# Patient Record
Sex: MALE | Race: OTHER | HISPANIC OR LATINO | Employment: FULL TIME | URBAN - METROPOLITAN AREA
[De-identification: names, ages, dates, MRNs, and addresses within clinical notes are randomized per-mention and may not be internally consistent; named-entity substitution may affect disease eponyms.]

---

## 2022-12-05 ENCOUNTER — HOSPITAL ENCOUNTER (OUTPATIENT)
Facility: MEDICAL CENTER | Age: 22
End: 2022-12-05
Attending: PHYSICIAN ASSISTANT

## 2022-12-05 ENCOUNTER — OFFICE VISIT (OUTPATIENT)
Dept: URGENT CARE | Facility: CLINIC | Age: 22
End: 2022-12-05
Payer: COMMERCIAL

## 2022-12-05 VITALS
DIASTOLIC BLOOD PRESSURE: 68 MMHG | RESPIRATION RATE: 16 BRPM | WEIGHT: 159 LBS | HEIGHT: 68 IN | HEART RATE: 89 BPM | TEMPERATURE: 97.4 F | SYSTOLIC BLOOD PRESSURE: 118 MMHG | BODY MASS INDEX: 24.1 KG/M2 | OXYGEN SATURATION: 97 %

## 2022-12-05 DIAGNOSIS — R30.0 DYSURIA: ICD-10-CM

## 2022-12-05 DIAGNOSIS — N45.1 EPIDIDYMITIS: ICD-10-CM

## 2022-12-05 LAB
APPEARANCE UR: CLEAR
BILIRUB UR STRIP-MCNC: NEGATIVE MG/DL
COLOR UR AUTO: YELLOW
GLUCOSE UR STRIP.AUTO-MCNC: NEGATIVE MG/DL
KETONES UR STRIP.AUTO-MCNC: NEGATIVE MG/DL
LEUKOCYTE ESTERASE UR QL STRIP.AUTO: NEGATIVE
NITRITE UR QL STRIP.AUTO: NEGATIVE
PH UR STRIP.AUTO: 7 [PH] (ref 5–8)
PROT UR QL STRIP: NEGATIVE MG/DL
RBC UR QL AUTO: NEGATIVE
SP GR UR STRIP.AUTO: 1.02
UROBILINOGEN UR STRIP-MCNC: NORMAL MG/DL

## 2022-12-05 PROCEDURE — 87591 N.GONORRHOEAE DNA AMP PROB: CPT

## 2022-12-05 PROCEDURE — 81002 URINALYSIS NONAUTO W/O SCOPE: CPT | Performed by: PHYSICIAN ASSISTANT

## 2022-12-05 PROCEDURE — 99203 OFFICE O/P NEW LOW 30 MIN: CPT | Performed by: PHYSICIAN ASSISTANT

## 2022-12-05 PROCEDURE — 87491 CHLMYD TRACH DNA AMP PROBE: CPT

## 2022-12-05 RX ORDER — IBUPROFEN 200 MG
200 TABLET ORAL EVERY 6 HOURS PRN
COMMUNITY

## 2022-12-05 RX ORDER — LEVOFLOXACIN 750 MG/1
750 TABLET, FILM COATED ORAL
COMMUNITY
Start: 2022-11-19 | End: 2023-07-22

## 2022-12-05 RX ORDER — AZITHROMYCIN 500 MG/1
TABLET, FILM COATED ORAL
COMMUNITY
Start: 2022-10-21 | End: 2023-07-22

## 2022-12-05 RX ORDER — DOXYCYCLINE 100 MG/1
100 CAPSULE ORAL 2 TIMES DAILY
Qty: 20 CAPSULE | Refills: 0 | Status: SHIPPED | OUTPATIENT
Start: 2022-12-05 | End: 2022-12-15

## 2022-12-05 ASSESSMENT — ENCOUNTER SYMPTOMS: AFFECTED TESTICLE: 1

## 2022-12-05 NOTE — PROGRESS NOTES
"Subjective:   Delbert Radford is a 22 y.o. male who presents for Testicle Pain ((R) 1wk )         Testicular pain, right greater than left x 1 week. Atraumatic.  Did test positive for chlamydia a couple of months ago.  Was treated with azithro and levaquin which finished 5 days ago.  Pain started during that treatment. Had dysuria originally.  Feels slight pain after urination now.  No urethral discharge.  No lesions rashes sores.  The STI testing was done online and the urine was sent out so we don't have access to any of this.  States had urine culture negative.  Testicular pain feels constant, better standing.  Has labs available that show pos chlamyia, neg gonorrhea, neg hcv, neg syphilis, neg hiv.  Has had sex since testing.    Testicle Pain        ROS    Medications:  azithromycin  ibuprofen Tabs  levoFLOXacin    Allergies:             Cat hair extract    Surgical History:       No past surgical history on file.    Past Social Hx:  Delbert Radford       Past Family Hx:   Delbert Radford family history is not on file.       Problem list, medications, and allergies reviewed by myself today in Epic.     Objective:     /68   Pulse 89   Temp 36.3 °C (97.4 °F) (Temporal)   Resp 16   Ht 1.727 m (5' 8\")   Wt 72.1 kg (159 lb)   SpO2 97%   BMI 24.18 kg/m²     Physical Exam  Vitals and nursing note reviewed.   Constitutional:       General: He is not in acute distress.     Appearance: Normal appearance. He is not ill-appearing.   HENT:      Head: Normocephalic.   Eyes:      Extraocular Movements: Extraocular movements intact.      Pupils: Pupils are equal, round, and reactive to light.   Cardiovascular:      Rate and Rhythm: Normal rate.   Pulmonary:      Effort: Pulmonary effort is normal.   Abdominal:      Hernia: There is no hernia in the left inguinal area or right inguinal area.   Genitourinary:     Pubic Area: No rash.       Penis: Uncircumcised.       Testes: Cremasteric reflex is present.         Right: " Tenderness present. Swelling or testicular hydrocele not present. Right testis is descended.         Left: Tenderness present. Swelling not present. Left testis is descended.      Epididymis:      Right: Inflamed. Not enlarged. Tenderness present. No mass.      Left: Inflamed. Not enlarged. Tenderness present. No mass.      Comments: Bilateral epididymal tenderness to palpation.  No overlying cellulitis.  No evidence of inguinal hernia.  No obvious hydrocele or varicocele.  Patient is uncircumcised.  Mild erythema to glans consistent with candidiasis.  No other obvious lesions rashes or sores.  No obvious torsion.  Skin:     General: Skin is warm.      Findings: No rash.   Neurological:      Mental Status: He is alert and oriented to person, place, and time.   Psychiatric:         Thought Content: Thought content normal.         Judgment: Judgment normal.     UA negative  Assessment/Plan:     Diagnosis and Associated Orders:     1. Dysuria  - POCT Urinalysis  - Chlamydia/GC, PCR (Urine); Future  - doxycycline (MONODOX) 100 MG capsule; Take 1 Capsule by mouth 2 times a day for 10 days.  Dispense: 20 Capsule; Refill: 0  - cefTRIAXone (ROCEPHIN) 500 mg, lidocaine (XYLOCAINE) 1 % 1.8 mL for IM use    2. Epididymitis  - doxycycline (MONODOX) 100 MG capsule; Take 1 Capsule by mouth 2 times a day for 10 days.  Dispense: 20 Capsule; Refill: 0  - cefTRIAXone (ROCEPHIN) 500 mg, lidocaine (XYLOCAINE) 1 % 1.8 mL for IM use    Other orders  - azithromycin (ZITHROMAX) 500 MG tablet; TAKE TWO TABLETS ORALLY IN A SINGLE DOSE. (Patient not taking: Reported on 12/5/2022)  - levoFLOXacin (LEVAQUIN) 750 MG tablet; Take 750 mg by mouth every day. (Patient not taking: Reported on 12/5/2022)  - ibuprofen (MOTRIN) 200 MG Tab; Take 200 mg by mouth every 6 hours as needed.      Comments/MDM:    Exam and history most consistent with epididymitis.  Will treat empirically.  UA normal.  We will send urine for GC and chlamydia.  If no evidence of  infection and persistent tenderness will recommend scrotal ultrasound.  We will be in touch with patient via MyChart regarding results of testing.  Recommend abstaining from intercourse until completing 7 days of treatment.  No obvious signs of testicular torsion gradual onset with bilateral involvement.  Vital signs stable and reassuring.  No evidence of sepsis.  No significant joint pain.  Has had unprotected sex since last testing.  See above.    I personally reviewed prior external notes and test results pertinent to today's visit.  Red flags discussed as well as indications to present to the Emergency Department.  Supportive care, natural history, differential diagnoses, and indications for immediate follow-up discussed.  Patient expresses understanding and agrees to plan.  Patient denies any other questions or concerns.    Follow-up with the primary care physician for recheck, reevaluation, and consideration of further management.      Please note that this dictation was created using voice recognition software. I have made a reasonable attempt to correct obvious errors, but I expect that there are errors of grammar and possibly content that I did not discover before finalizing the note.    This note was electronically signed by Angela Morales PA-C

## 2022-12-06 LAB
C TRACH DNA SPEC QL NAA+PROBE: NEGATIVE
N GONORRHOEA DNA SPEC QL NAA+PROBE: NEGATIVE
SPECIMEN SOURCE: NORMAL

## 2023-07-22 ENCOUNTER — OFFICE VISIT (OUTPATIENT)
Dept: URGENT CARE | Facility: CLINIC | Age: 23
End: 2023-07-22
Payer: COMMERCIAL

## 2023-07-22 VITALS
RESPIRATION RATE: 14 BRPM | HEIGHT: 68 IN | TEMPERATURE: 97.9 F | SYSTOLIC BLOOD PRESSURE: 126 MMHG | OXYGEN SATURATION: 97 % | HEART RATE: 82 BPM | DIASTOLIC BLOOD PRESSURE: 80 MMHG | WEIGHT: 159 LBS | BODY MASS INDEX: 24.1 KG/M2

## 2023-07-22 DIAGNOSIS — S71.111A LACERATION OF RIGHT THIGH, INITIAL ENCOUNTER: ICD-10-CM

## 2023-07-22 PROCEDURE — 3074F SYST BP LT 130 MM HG: CPT | Performed by: NURSE PRACTITIONER

## 2023-07-22 PROCEDURE — 3079F DIAST BP 80-89 MM HG: CPT | Performed by: NURSE PRACTITIONER

## 2023-07-22 PROCEDURE — 12001 RPR S/N/AX/GEN/TRNK 2.5CM/<: CPT | Performed by: NURSE PRACTITIONER

## 2023-07-22 PROCEDURE — 90715 TDAP VACCINE 7 YRS/> IM: CPT | Performed by: NURSE PRACTITIONER

## 2023-07-22 PROCEDURE — 90471 IMMUNIZATION ADMIN: CPT | Performed by: NURSE PRACTITIONER

## 2023-07-22 ASSESSMENT — ENCOUNTER SYMPTOMS
MUSCULOSKELETAL NEGATIVE: 1
CONSTITUTIONAL NEGATIVE: 1
NEUROLOGICAL NEGATIVE: 1
ROS SKIN COMMENTS: PER HPI

## 2023-07-22 ASSESSMENT — VISUAL ACUITY: OU: 1

## 2023-07-22 NOTE — PROGRESS NOTES
"Subjective:     Delbert Radford is a 22 y.o. male who presents for Laceration (Back of R thigh, cut with unknown object x yesterday)       Laceration   The laceration is 2 cm in size. The laceration mechanism is unknown.The pain is mild. He reports no foreign bodies present. His tetanus status is unknown.     Patient was outdoors around 11 PM last night, about 2 hours ago.  Fell and afterwards noticed a cut behind his right thigh.  Does not recall specific injury.    Tdap received 12/10/2010 per chart review. Patient reports possibly receiving this again after, but unsure.    Brother present.    Review of Systems   Constitutional: Negative.    Musculoskeletal: Negative.    Skin:         Per HPI   Neurological: Negative.    All other systems reviewed and are negative.    Refer to HPI for additional details.    During this visit, appropriate PPE was worn, and hand hygiene was performed.    PMH:  has no past medical history on file.    MEDS:   Current Outpatient Medications:     ibuprofen (MOTRIN) 200 MG Tab, Take 200 mg by mouth every 6 hours as needed., Disp: , Rfl:     ALLERGIES:   Allergies   Allergen Reactions    Cat Hair Extract      SURGHX: History reviewed. No pertinent surgical history.  SOCHX:  reports that he has never smoked. He has never used smokeless tobacco. He reports that he does not drink alcohol and does not use drugs.    FH: Per HPI as applicable/pertinent.      Objective:     /80 (BP Location: Right arm, Patient Position: Sitting, BP Cuff Size: Adult)   Pulse 82   Temp 36.6 °C (97.9 °F) (Temporal)   Resp 14   Ht 1.727 m (5' 8\")   Wt 72.1 kg (159 lb)   SpO2 97%   BMI 24.18 kg/m²     Physical Exam  Nursing note reviewed.   Constitutional:       General: He is not in acute distress.     Appearance: He is well-developed. He is not ill-appearing or toxic-appearing.   Eyes:      General: Vision grossly intact.   Cardiovascular:      Rate and Rhythm: Normal rate.   Pulmonary:      Effort: " Pulmonary effort is normal. No respiratory distress.   Musculoskeletal:         General: No deformity. Normal range of motion.      Comments: 2 cm straight laceration at posterior right thigh, subcutaneous tissue visible, no visible tendon or bone   Skin:     General: Skin is warm and dry.      Coloration: Skin is not pale.   Neurological:      Mental Status: He is alert and oriented to person, place, and time.      Motor: No weakness.   Psychiatric:         Behavior: Behavior normal. Behavior is cooperative.       Assessment/Plan:     1. Laceration of right thigh, initial encounter  - Tdap =>6yo IM    Procedure: Laceration Repair of Right Thigh  - Risks, benefits, methods, and alternatives of primary wound closure reviewed.  - Verbal consent received from patient to proceed with laceration repair with sutures.  - Wound length 2 cm, right posterior thigh, straight laceration, subcutaneous tissue visible, NVI, no visible tendon or bone.  - Area copiously irrigated with NS; no foreign bodies identified.  - Area soaked and cleansed with chlorhexidine solution and sterile saline.  - Local anesthesia achieved with 1 mL of 1% lidocaine without epinephrine.  - Site prepared with Betadine.  - Sterile technique with sterile instruments.  - Applied #5 interrupted sutures with 4.0 Ethilon; good wound edge approximation achieved.  - Irrigated copiously with NS.  - Minimal bleeding with good hemostasis achieved.   - Antibiotic ointment and non-adhesive dressing applied.  - There were no procedural complications.  - Patient tolerated procedure well.    Patient advised to monitor for signs of infection including, but not limited to, increased redness, warmth, pain, swelling, discharge, or fever. Wound care instructions provided. Cleanse with mild soapy water at least once a day. May briefly wet, but do not soak. Keep wound clean, dry, and protected. Cover with clean dressing as needed, but replace any dressing used at least once  every 24 hours or as needed. May apply ice packs, elevate, and take OTC acetaminophen/ibuprofen for pain/inflammation, per 's instructions, unless contraindicated. Suture removal in 10 days. Tdap updated.    Differential diagnosis, natural history, supportive care, over-the-counter symptom management per 's instructions, close monitoring, and indications for immediate follow-up discussed.     All questions answered. Patient agrees with the plan of care.    Discharge summary provided via [facility]

## 2025-03-17 ENCOUNTER — OFFICE VISIT (OUTPATIENT)
Dept: URGENT CARE | Facility: CLINIC | Age: 25
End: 2025-03-17
Payer: COMMERCIAL

## 2025-03-17 VITALS
HEIGHT: 68 IN | HEART RATE: 88 BPM | TEMPERATURE: 97.8 F | SYSTOLIC BLOOD PRESSURE: 124 MMHG | WEIGHT: 179 LBS | BODY MASS INDEX: 27.13 KG/M2 | OXYGEN SATURATION: 98 % | DIASTOLIC BLOOD PRESSURE: 68 MMHG | RESPIRATION RATE: 16 BRPM

## 2025-03-17 DIAGNOSIS — B97.89 VIRAL RESPIRATORY INFECTION: ICD-10-CM

## 2025-03-17 DIAGNOSIS — J98.8 VIRAL RESPIRATORY INFECTION: ICD-10-CM

## 2025-03-17 DIAGNOSIS — Z20.828 EXPOSURE TO INFLUENZA: ICD-10-CM

## 2025-03-17 LAB
FLUAV RNA SPEC QL NAA+PROBE: NEGATIVE
FLUBV RNA SPEC QL NAA+PROBE: NEGATIVE
RSV RNA SPEC QL NAA+PROBE: POSITIVE
SARS-COV-2 RNA RESP QL NAA+PROBE: NEGATIVE

## 2025-03-17 PROCEDURE — 99213 OFFICE O/P EST LOW 20 MIN: CPT | Performed by: NURSE PRACTITIONER

## 2025-03-17 PROCEDURE — 0241U POCT CEPHEID COV-2, FLU A/B, RSV - PCR: CPT | Performed by: NURSE PRACTITIONER

## 2025-03-17 PROCEDURE — 3078F DIAST BP <80 MM HG: CPT | Performed by: NURSE PRACTITIONER

## 2025-03-17 PROCEDURE — 3074F SYST BP LT 130 MM HG: CPT | Performed by: NURSE PRACTITIONER

## 2025-03-17 ASSESSMENT — ENCOUNTER SYMPTOMS
CHILLS: 1
WHEEZING: 0
COUGH: 1
SORE THROAT: 1
VOMITING: 0
SPUTUM PRODUCTION: 0
DIARRHEA: 0
RHINORRHEA: 1
SHORTNESS OF BREATH: 0
FEVER: 0
HEMOPTYSIS: 0

## 2025-03-17 NOTE — LETTER
March 17, 2025         Patient: Delbert Radford   YOB: 2000   Date of Visit: 3/17/2025           To Whom it May Concern:    Delbert Radford was seen in my clinic on 3/17/2025. He may return to work on 3/17/2025. Recommendation is to work from home for the next 3 days, then can return to a public/in work setting. May return sooner if symptoms improve.      If you have any questions or concerns, please don't hesitate to call.        Sincerely,           MIGUEL A Villanueva.  Electronically Signed

## 2025-03-17 NOTE — PROGRESS NOTES
Subjective:     Delbert Radford is a 24 y.o. male who presents for Cough (Patient coming in for flu like symptoms,  headaches,  body aches, cough congestion ) and Other (Patient was expose to flu B and RSV by roommate)      Roommate has the flu and RSV.     Cough  This is a new problem. The current episode started in the past 7 days. The problem has been unchanged. Associated symptoms include chills, nasal congestion, rhinorrhea and a sore throat. Pertinent negatives include no fever, hemoptysis, shortness of breath or wheezing.   Other  Associated symptoms include chills, congestion, coughing and a sore throat. Pertinent negatives include no fever or vomiting.       History reviewed. No pertinent past medical history.    History reviewed. No pertinent surgical history.    Social History     Socioeconomic History    Marital status: Single     Spouse name: Not on file    Number of children: Not on file    Years of education: Not on file    Highest education level: Not on file   Occupational History    Not on file   Tobacco Use    Smoking status: Never    Smokeless tobacco: Never   Substance and Sexual Activity    Alcohol use: Never    Drug use: Never    Sexual activity: Not on file   Other Topics Concern    Not on file   Social History Narrative    Not on file     Social Drivers of Health     Financial Resource Strain: Not on file   Food Insecurity: Not on file   Transportation Needs: Not on file   Physical Activity: Not on file   Stress: Not on file   Social Connections: Not on file   Intimate Partner Violence: Not on file   Housing Stability: Not on file        History reviewed. No pertinent family history.     Allergies   Allergen Reactions    Cat Hair Extract        Review of Systems   Constitutional:  Positive for chills. Negative for fever.   HENT:  Positive for congestion, rhinorrhea and sore throat.    Respiratory:  Positive for cough. Negative for hemoptysis, sputum production, shortness of breath and  "wheezing.    Gastrointestinal:  Negative for diarrhea and vomiting.   All other systems reviewed and are negative.       Objective:   /68   Pulse 88   Temp 36.6 °C (97.8 °F)   Resp 16   Ht 1.727 m (5' 8\")   Wt 81.2 kg (179 lb)   SpO2 98%   BMI 27.22 kg/m²     Physical Exam  Vitals reviewed.   Constitutional:       General: He is not in acute distress.     Appearance: He is well-developed. He is not toxic-appearing.   HENT:      Head: Normocephalic and atraumatic.      Right Ear: Ear canal and external ear normal. No drainage or swelling. A middle ear effusion is present. Tympanic membrane is not perforated or erythematous.      Left Ear: Tympanic membrane, ear canal and external ear normal.      Nose: Rhinorrhea present.      Mouth/Throat:      Mouth: Mucous membranes are moist.      Pharynx: Posterior oropharyngeal erythema present. No oropharyngeal exudate.   Eyes:      Conjunctiva/sclera: Conjunctivae normal.   Cardiovascular:      Rate and Rhythm: Normal rate.   Pulmonary:      Effort: Pulmonary effort is normal. No respiratory distress.      Breath sounds: Normal breath sounds. No stridor. No wheezing, rhonchi or rales.   Skin:     General: Skin is warm and dry.      Findings: No rash.   Neurological:      Mental Status: He is alert and oriented to person, place, and time.      GCS: GCS eye subscore is 4. GCS verbal subscore is 5. GCS motor subscore is 6.   Psychiatric:         Speech: Speech normal.         Assessment/Plan:   1. Viral respiratory infection  - POCT Cepheid CoV-2, Flu A/B, RSV - PCR    2. Exposure to influenza  - POCT Cepheid CoV-2, Flu A/B, RSV - PCR    Results for orders placed or performed in visit on 03/17/25   POCT Cepheid CoV-2, Flu A/B, RSV - PCR    Collection Time: 03/17/25  5:03 PM   Result Value Ref Range    SARS-CoV-2 by PCR Negative Negative, Invalid    Influenza virus A RNA Negative Negative, Invalid    Influenza virus B, PCR Negative Negative, Invalid    RSV, PCR " Positive (A) Negative, Invalid     Symptomatic care.  -Oral hydration and rest.   -Cough control: nonpharmacologic options for cough relief such as throat lozenges, hot tea, honey.  -Over the counter expectorant as directed; Guaifenesin (Mucinex).  -Tylenol or ibuprofen for pain and fever as directed.   -Warm salt water gargles.  -OTC Throat lozenges or spray (Cepacol).  -Sudafed or allergy medication for ears.     Seek emergency medical care immediately for: Trouble breathing, persistent pain or pressure in the chest, confusion, inability to wake or stay awake, bluish lips or face, persistent tachycardia (fast heart rate), prolonged dizziness, persistent high grade fevers. Follow up for prolonged cough, persistent wheezing, persistent throat pain, difficulty swallowing, persistent fevers, leg swelling, or any other concerns. Follow up with your Primary Care Provider.     -Discussed viral etiology., symptomatic care, and S&S of PNA with follow up. Stable Vitals.    Differential diagnosis, natural history, supportive care, and indications for immediate follow-up discussed.

## 2025-03-17 NOTE — PATIENT INSTRUCTIONS
